# Patient Record
Sex: FEMALE | URBAN - METROPOLITAN AREA
[De-identification: names, ages, dates, MRNs, and addresses within clinical notes are randomized per-mention and may not be internally consistent; named-entity substitution may affect disease eponyms.]

---

## 2021-03-09 ENCOUNTER — APPOINTMENT (OUTPATIENT)
Age: 41
Setting detail: DERMATOLOGY
End: 2021-03-09

## 2021-03-09 VITALS — TEMPERATURE: 97.8 F | RESPIRATION RATE: 18 BRPM | WEIGHT: 117 LBS | HEIGHT: 64 IN

## 2021-03-09 DIAGNOSIS — K13.0 DISEASES OF LIPS: ICD-10-CM

## 2021-03-09 PROCEDURE — OTHER MEDICATION COUNSELING: OTHER

## 2021-03-09 PROCEDURE — OTHER COUNSELING: OTHER

## 2021-03-09 PROCEDURE — OTHER PRESCRIPTION: OTHER

## 2021-03-09 PROCEDURE — 99202 OFFICE O/P NEW SF 15 MIN: CPT

## 2021-03-09 PROCEDURE — OTHER PRESCRIPTION MEDICATION MANAGEMENT: OTHER

## 2021-03-09 RX ORDER — HYDROCORTISONE 25 MG/G
CREAM TOPICAL BID
Qty: 1 | Refills: 3 | Status: ERX | COMMUNITY
Start: 2021-03-09

## 2021-03-09 RX ORDER — FLUCONAZOLE 150 MG/1
TABLET ORAL
Qty: 1 | Refills: 0 | Status: CANCELLED

## 2021-03-09 ASSESSMENT — LOCATION SIMPLE DESCRIPTION DERM: LOCATION SIMPLE: LEFT CHEEK

## 2021-03-09 ASSESSMENT — LOCATION DETAILED DESCRIPTION DERM: LOCATION DETAILED: LEFT SUPERIOR MEDIAL BUCCAL CHEEK

## 2021-03-09 ASSESSMENT — LOCATION ZONE DERM: LOCATION ZONE: FACE

## 2021-03-09 NOTE — PROCEDURE: PRESCRIPTION MEDICATION MANAGEMENT
Initiate Treatment: hydrocortisone 2.5 % topical cream, Apply to affected areas twice daily x1 week, rest for one week. Repeat cycle as needed.\\n\\nOTC: clotrimazole cream apply BID
Render In Strict Bullet Format?: No
Detail Level: Zone
Plan: Will re-evaluate in 4 weeks.
Discontinue Regimen: ketoconazole cream; patient states worsening with use

## 2021-03-09 NOTE — PROCEDURE: MEDICATION COUNSELING
Xeljackiez Pregnancy And Lactation Text: This medication is Pregnancy Category D and is not considered safe during pregnancy.  The risk during breast feeding is also uncertain.

## 2021-04-06 ENCOUNTER — APPOINTMENT (OUTPATIENT)
Age: 41
Setting detail: DERMATOLOGY
End: 2021-04-06

## 2021-04-06 VITALS — HEIGHT: 64 IN | TEMPERATURE: 98.4 F | WEIGHT: 117 LBS | RESPIRATION RATE: 18 BRPM

## 2021-04-06 DIAGNOSIS — K13.0 DISEASES OF LIPS: ICD-10-CM

## 2021-04-06 PROCEDURE — OTHER MIPS QUALITY: OTHER

## 2021-04-06 PROCEDURE — OTHER COUNSELING: OTHER

## 2021-04-06 PROCEDURE — 99213 OFFICE O/P EST LOW 20 MIN: CPT

## 2021-04-06 PROCEDURE — OTHER PRESCRIPTION MEDICATION MANAGEMENT: OTHER

## 2021-04-06 PROCEDURE — OTHER PRESCRIPTION: OTHER

## 2021-04-06 PROCEDURE — OTHER MEDICATION COUNSELING: OTHER

## 2021-04-06 RX ORDER — CRISABOROLE 20 MG/G
OINTMENT TOPICAL
Qty: 1 | Refills: 2 | Status: ERX | COMMUNITY
Start: 2021-04-06

## 2021-04-06 ASSESSMENT — LOCATION ZONE DERM: LOCATION ZONE: LIP

## 2021-04-06 ASSESSMENT — LOCATION DETAILED DESCRIPTION DERM: LOCATION DETAILED: LEFT SUPERIOR VERMILION LIP

## 2021-04-06 ASSESSMENT — LOCATION SIMPLE DESCRIPTION DERM: LOCATION SIMPLE: LEFT LIP

## 2021-04-06 NOTE — PROCEDURE: PRESCRIPTION MEDICATION MANAGEMENT
Initiate Treatment: Eucrisa 2 % topical ointment, Apply to affected areas around mouth nightly as tolerated\\nadvised to alternate weekly with hydrocortisone
Render In Strict Bullet Format?: No
Continue Regimen: Hydrocortisone 2.5% cream, apply to affected areas around mouth twice a day x1 week, stop x1 week. Repeat for flares \\nOTC regimen: daily aquaphor application
Detail Level: Zone
Plan: Return to clinic in 3 months for a follow up visit

## 2021-11-08 NOTE — PROCEDURE: MEDICATION COUNSELING
negative Regular rate & rhythm, normal S1, S2; no murmurs, gallops or rubs; no S3, S4 Minocycline Counseling: Patient advised regarding possible photosensitivity and discoloration of the teeth, skin, lips, tongue and gums.  Patient instructed to avoid sunlight, if possible.  When exposed to sunlight, patients should wear protective clothing, sunglasses, and sunscreen.  The patient was instructed to call the office immediately if the following severe adverse effects occur:  hearing changes, easy bruising/bleeding, severe headache, or vision changes.  The patient verbalized understanding of the proper use and possible adverse effects of minocycline.  All of the patient's questions and concerns were addressed.

## 2022-01-10 ENCOUNTER — APPOINTMENT (OUTPATIENT)
Dept: URBAN - METROPOLITAN AREA CLINIC 265 | Age: 42
Setting detail: DERMATOLOGY
End: 2022-01-10

## 2022-01-10 VITALS — RESPIRATION RATE: 18 BRPM | WEIGHT: 118 LBS | HEIGHT: 64 IN

## 2022-01-10 DIAGNOSIS — K13.0 DISEASES OF LIPS: ICD-10-CM

## 2022-01-10 PROCEDURE — OTHER MEDICATION COUNSELING: OTHER

## 2022-01-10 PROCEDURE — OTHER MIPS QUALITY: OTHER

## 2022-01-10 PROCEDURE — OTHER PRESCRIPTION MEDICATION MANAGEMENT: OTHER

## 2022-01-10 PROCEDURE — 99212 OFFICE O/P EST SF 10 MIN: CPT

## 2022-01-10 PROCEDURE — OTHER COUNSELING: OTHER

## 2022-01-10 ASSESSMENT — LOCATION DETAILED DESCRIPTION DERM: LOCATION DETAILED: LEFT SUPERIOR MEDIAL BUCCAL CHEEK

## 2022-01-10 ASSESSMENT — LOCATION SIMPLE DESCRIPTION DERM: LOCATION SIMPLE: LEFT CHEEK

## 2022-01-10 ASSESSMENT — LOCATION ZONE DERM: LOCATION ZONE: FACE

## 2022-01-10 NOTE — PROCEDURE: MIPS QUALITY
Quality 226: Preventive Care And Screening: Tobacco Use: Screening And Cessation Intervention: Patient screened for tobacco use and is an ex/non-smoker
Quality 130: Documentation Of Current Medications In The Medical Record: Current Medications Documented
Quality 110: Preventive Care And Screening: Influenza Immunization: Influenza Immunization Administered during Influenza season
Detail Level: Detailed
Quality 431: Preventive Care And Screening: Unhealthy Alcohol Use - Screening: Patient identified as an unhealthy alcohol user when screened for unhealthy alcohol use using a systematic screening method and received brief counseling

## 2022-01-10 NOTE — PROCEDURE: MEDICATION COUNSELING
"Late entry for ego-strengthening therapeutic 1:1 with pt. Team had identified as therapeutic goals to focus on empowering, resilience and internal locus of control themes.     Prior to actual session, discussion with pt to elicit what strength in the midst of adversity and resilience mean to her. Initially commented on \"not giving up and respecting herself enough not to hurt herself (SIB)\" However, was able to expand and commented on \"still having a bright future, and I have control over that with my decisions.\"    Incorporated pt's words, meanings and hopes into ego-strengthening meditation. Asked her to note whether she could feel the effect either in physical sensation or internal sense; however it may come about for her to provide an anker for her.    After the session, she explained that normally when she is stressed and dysregulated she feels pressure in her chest area. \"this felt more like warmth, like a hug, only from within. \" Suggested casually, if she chose to she could practice bringing up that sensation, while thinking well of herself.   " Spironolactone Counseling: Patient advised regarding risks of diarrhea, abdominal pain, hyperkalemia, birth defects (for female patients), liver toxicity and renal toxicity. The patient may need blood work to monitor liver and kidney function and potassium levels while on therapy. The patient verbalized understanding of the proper use and possible adverse effects of spironolactone.  All of the patient's questions and concerns were addressed.

## 2022-01-10 NOTE — PROCEDURE: PRESCRIPTION MEDICATION MANAGEMENT
Continue Regimen: otc clotrimazole in corners of lips\\n\\nhydrocortisone 2.5 % topical cream, Apply to affected areas twice daily x1 week, rest for one week. Repeat cycle as needed.\\n\\nOTC aquaphor
Detail Level: Zone
Render In Strict Bullet Format?: No
Plan: Patient has been noting flaking and dryness of lips. States it improves when she uses aquaphor. Advised to continue to use moisturizer. I advised if any single area of irritation persists, she should return to clinic. \\nWill re-evaluate as needed

## 2023-01-16 NOTE — PROCEDURE: MEDICATION COUNSELING
Incoming voicemail from pt and wondering when CT would be scheduled. Forwarded message to scheduling to call back when they are in the office. -SC   Imiquimod Pregnancy And Lactation Text: This medication is Pregnancy Category C. It is unknown if this medication is excreted in breast milk.

## 2023-03-24 NOTE — PROCEDURE: MEDICATION COUNSELING
No additional labs needed currently   Odomzo Counseling- I discussed with the patient the risks of Odomzo including but not limited to nausea, vomiting, diarrhea, constipation, weight loss, changes in the sense of taste, decreased appetite, muscle spasms, and hair loss.  The patient verbalized understanding of the proper use and possible adverse effects of Odomzo.  All of the patient's questions and concerns were addressed.